# Patient Record
Sex: FEMALE | URBAN - METROPOLITAN AREA
[De-identification: names, ages, dates, MRNs, and addresses within clinical notes are randomized per-mention and may not be internally consistent; named-entity substitution may affect disease eponyms.]

---

## 2018-01-04 ENCOUNTER — APPOINTMENT (RX ONLY)
Dept: URBAN - METROPOLITAN AREA CLINIC 121 | Facility: CLINIC | Age: 73
Setting detail: DERMATOLOGY
End: 2018-01-04

## 2018-01-04 DIAGNOSIS — L90.5 SCAR CONDITIONS AND FIBROSIS OF SKIN: ICD-10-CM

## 2018-01-04 DIAGNOSIS — D18.0 HEMANGIOMA: ICD-10-CM

## 2018-01-04 DIAGNOSIS — L82.1 OTHER SEBORRHEIC KERATOSIS: ICD-10-CM

## 2018-01-04 DIAGNOSIS — D485 NEOPLASM OF UNCERTAIN BEHAVIOR OF SKIN: ICD-10-CM

## 2018-01-04 PROBLEM — M12.9 ARTHROPATHY, UNSPECIFIED: Status: ACTIVE | Noted: 2018-01-04

## 2018-01-04 PROBLEM — F32.9 MAJOR DEPRESSIVE DISORDER, SINGLE EPISODE, UNSPECIFIED: Status: ACTIVE | Noted: 2018-01-04

## 2018-01-04 PROBLEM — D18.01 HEMANGIOMA OF SKIN AND SUBCUTANEOUS TISSUE: Status: ACTIVE | Noted: 2018-01-04

## 2018-01-04 PROBLEM — J45.909 UNSPECIFIED ASTHMA, UNCOMPLICATED: Status: ACTIVE | Noted: 2018-01-04

## 2018-01-04 PROBLEM — I25.10 ATHEROSCLEROTIC HEART DISEASE OF NATIVE CORONARY ARTERY WITHOUT ANGINA PECTORIS: Status: ACTIVE | Noted: 2018-01-04

## 2018-01-04 PROBLEM — D48.5 NEOPLASM OF UNCERTAIN BEHAVIOR OF SKIN: Status: ACTIVE | Noted: 2018-01-04

## 2018-01-04 PROBLEM — L57.0 ACTINIC KERATOSIS: Status: ACTIVE | Noted: 2018-01-04

## 2018-01-04 PROCEDURE — ? BIOPSY BY SHAVE METHOD

## 2018-01-04 PROCEDURE — 11100: CPT

## 2018-01-04 PROCEDURE — 99203 OFFICE O/P NEW LOW 30 MIN: CPT | Mod: 25

## 2018-01-04 PROCEDURE — ? COUNSELING

## 2018-01-04 ASSESSMENT — LOCATION DETAILED DESCRIPTION DERM
LOCATION DETAILED: INFERIOR THORACIC SPINE
LOCATION DETAILED: INFERIOR MID FOREHEAD
LOCATION DETAILED: RIGHT INFERIOR CENTRAL MALAR CHEEK
LOCATION DETAILED: PERIUMBILICAL SKIN
LOCATION DETAILED: RIGHT PROXIMAL PRETIBIAL REGION
LOCATION DETAILED: LEFT CENTRAL MALAR CHEEK

## 2018-01-04 ASSESSMENT — LOCATION ZONE DERM
LOCATION ZONE: FACE
LOCATION ZONE: TRUNK
LOCATION ZONE: LEG

## 2018-01-04 ASSESSMENT — LOCATION SIMPLE DESCRIPTION DERM
LOCATION SIMPLE: LEFT CHEEK
LOCATION SIMPLE: UPPER BACK
LOCATION SIMPLE: RIGHT PRETIBIAL REGION
LOCATION SIMPLE: ABDOMEN
LOCATION SIMPLE: INFERIOR FOREHEAD
LOCATION SIMPLE: RIGHT CHEEK

## 2018-01-04 NOTE — PROCEDURE: BIOPSY BY SHAVE METHOD
Biopsy Method: Dermablade
Additional Anesthesia Volume In Cc (Will Not Render If 0): 0
Render Post-Care Instructions In Note?: no
Body Location Override (Optional - Billing Will Still Be Based On Selected Body Map Location If Applicable): Right Lower Leg
Type Of Destruction Used: Curettage
Notification Instructions: Patient will be notified of biopsy results. However, patient instructed to call the office if not contacted within 2 weeks.
Post-Care Instructions: I reviewed with the patient in detail post-care instructions. Patient is to keep the biopsy site dry overnight, and then apply bacitracin twice daily until healed. Patient may apply hydrogen peroxide soaks to remove any crusting.
Cryotherapy Text: The wound bed was treated with cryotherapy after the biopsy was performed.
Consent: Written consent was obtained and risks were reviewed including but not limited to scarring, infection, bleeding, scabbing, incomplete removal, nerve damage and allergy to anesthesia.
Wound Care: Bacitracin
Anesthesia Type: 1% lidocaine without epinephrine
Billing Type: [employer]
Detail Level: Detailed
Hemostasis: Drysol
Lab: [facility]
Silver Nitrate Text: The wound bed was treated with silver nitrate after the biopsy was performed.
Electrodesiccation Text: The wound bed was treated with electrodesiccation after the biopsy was performed.
Dressing: bandage
Biopsy Type: H and E
Electrodesiccation And Curettage Text: The wound bed was treated with electrodesiccation and curettage after the biopsy was performed.

## 2018-03-22 ENCOUNTER — IMPORTED ENCOUNTER (OUTPATIENT)
Dept: URBAN - METROPOLITAN AREA CLINIC 31 | Facility: CLINIC | Age: 73
End: 2018-03-22

## 2018-03-22 PROBLEM — D31.32: Noted: 2018-03-22

## 2018-03-22 PROBLEM — Z96.1: Noted: 2018-03-22

## 2018-03-22 PROBLEM — H01.005: Noted: 2018-03-22

## 2018-03-22 PROBLEM — H01.004: Noted: 2018-03-22

## 2018-03-22 PROBLEM — H01.002: Noted: 2018-03-22

## 2018-03-22 PROBLEM — H01.001: Noted: 2018-03-22

## 2018-03-22 PROBLEM — H26.493: Noted: 2018-03-22

## 2018-03-22 PROCEDURE — 99213 OFFICE O/P EST LOW 20 MIN: CPT

## 2018-03-22 PROCEDURE — 92250 FUNDUS PHOTOGRAPHY W/I&R: CPT

## 2019-10-22 ENCOUNTER — IMPORTED ENCOUNTER (OUTPATIENT)
Dept: URBAN - METROPOLITAN AREA CLINIC 31 | Facility: CLINIC | Age: 74
End: 2019-10-22

## 2019-10-22 PROBLEM — E11.9: Noted: 2019-10-22

## 2019-10-22 PROBLEM — H26.493: Noted: 2019-10-22

## 2019-10-22 PROBLEM — H43.391: Noted: 2019-10-22

## 2019-10-22 PROBLEM — H26.492: Noted: 2019-10-22

## 2019-10-22 PROBLEM — D31.32: Noted: 2019-10-22

## 2019-10-22 PROBLEM — Z96.1: Noted: 2019-10-22

## 2019-10-22 PROBLEM — H43.813: Noted: 2019-10-22

## 2019-10-22 PROCEDURE — 92250 FUNDUS PHOTOGRAPHY W/I&R: CPT

## 2019-10-22 PROCEDURE — 92014 COMPRE OPH EXAM EST PT 1/>: CPT

## 2019-11-29 ENCOUNTER — NEW PATIENT (OUTPATIENT)
Dept: URBAN - METROPOLITAN AREA CLINIC 26 | Facility: CLINIC | Age: 74
End: 2019-11-29

## 2019-11-29 VITALS
HEART RATE: 80 BPM | BODY MASS INDEX: 30.73 KG/M2 | SYSTOLIC BLOOD PRESSURE: 145 MMHG | WEIGHT: 180 LBS | HEIGHT: 64 IN | DIASTOLIC BLOOD PRESSURE: 76 MMHG

## 2019-11-29 DIAGNOSIS — H35.363: ICD-10-CM

## 2019-11-29 DIAGNOSIS — H43.392: ICD-10-CM

## 2019-11-29 DIAGNOSIS — H43.391: ICD-10-CM

## 2019-11-29 DIAGNOSIS — E11.9: ICD-10-CM

## 2019-11-29 DIAGNOSIS — H43.811: ICD-10-CM

## 2019-11-29 PROCEDURE — 99204 OFFICE O/P NEW MOD 45 MIN: CPT

## 2019-11-29 PROCEDURE — 92225 OPHTHALMOSCOPY (INITIAL): CPT

## 2019-11-29 PROCEDURE — 92250 FUNDUS PHOTOGRAPHY W/I&R: CPT

## 2019-11-29 ASSESSMENT — TONOMETRY
OS_IOP_MMHG: 13
OD_IOP_MMHG: 13

## 2019-11-29 ASSESSMENT — VISUAL ACUITY
OS_SC: 20/20-1
OD_SC: 20/30-2

## 2020-07-16 ENCOUNTER — IMPORTED ENCOUNTER (OUTPATIENT)
Dept: URBAN - METROPOLITAN AREA CLINIC 31 | Facility: CLINIC | Age: 75
End: 2020-07-16

## 2020-07-16 PROBLEM — Z96.1: Noted: 2020-07-16

## 2020-07-16 PROBLEM — E11.9: Noted: 2020-07-16

## 2020-07-16 PROBLEM — D31.32: Noted: 2020-07-16

## 2020-07-16 PROBLEM — H26.492: Noted: 2020-07-16

## 2020-07-16 PROCEDURE — 92250 FUNDUS PHOTOGRAPHY W/I&R: CPT

## 2020-07-16 PROCEDURE — 92014 COMPRE OPH EXAM EST PT 1/>: CPT

## 2022-04-02 ASSESSMENT — VISUAL ACUITY
OS_SC: J314''
OS_SC: J1014''
OS_CC: 20/25
OD_SC: J714''
OD_CC: 20/25
OD_CC: 20/25-2
OS_CC: 20/20
OD_SC: J1014''
OS_CC: 20/25
OD_CC: 20/30+2

## 2022-04-02 ASSESSMENT — TONOMETRY
OS_IOP_MMHG: 10
OD_IOP_MMHG: 12
OS_IOP_MMHG: 12
OS_IOP_MMHG: 12
OD_IOP_MMHG: 10
OD_IOP_MMHG: 12

## 2022-07-09 ENCOUNTER — TELEPHONE ENCOUNTER (OUTPATIENT)
Dept: URBAN - METROPOLITAN AREA CLINIC 121 | Facility: CLINIC | Age: 77
End: 2022-07-09

## 2022-07-09 RX ORDER — ALPRAZOLAM 0.25 MG
TABLET ORAL AS NEEDED
Refills: 0 | OUTPATIENT
Start: 2017-09-07 | End: 2017-10-31

## 2022-07-09 RX ORDER — MULTIVIT-MIN/FA/LYCOPEN/LUTEIN .4-300-25
TABLET ORAL ONCE A DAY
Refills: 0 | OUTPATIENT
Start: 2017-10-31 | End: 2019-07-15

## 2022-07-09 RX ORDER — EZETIMIBE AND SIMVASTATIN 10; 20 MG/1; MG/1
TABLET ORAL ONCE A DAY
Refills: 0 | OUTPATIENT
Start: 2019-05-21 | End: 2019-07-15

## 2022-07-09 RX ORDER — OMEPRAZOLE 40 MG/1
CAPSULE, DELAYED RELEASE ORAL ONCE A DAY
Refills: 0 | OUTPATIENT
Start: 2019-05-21 | End: 2019-07-15

## 2022-07-09 RX ORDER — ALBUTEROL SULFATE 90 UG/1
AEROSOL, METERED RESPIRATORY (INHALATION)
Refills: 0 | OUTPATIENT
Start: 2019-05-21 | End: 2019-07-15

## 2022-07-09 RX ORDER — ACETAMINOPHEN, DEXTROMETHORPHAN HBR, DOXYLAMINE SUCCINATE, PHENYLEPHRINE HCL 10-5-325MG
KIT ORAL ONCE A DAY
Refills: 0 | OUTPATIENT
Start: 2017-10-31 | End: 2019-07-15

## 2022-07-09 RX ORDER — MONTELUKAST 10 MG/1
TABLET, FILM COATED ORAL ONCE A DAY
Refills: 0 | OUTPATIENT
Start: 2017-10-31 | End: 2019-07-15

## 2022-07-09 RX ORDER — TRAMADOL HYDROCHLORIDE AND ACETAMINOPHEN 37.5; 325 MG/1; MG/1
TABLET, FILM COATED ORAL
Refills: 0 | OUTPATIENT
Start: 2017-09-07 | End: 2017-10-31

## 2022-07-09 RX ORDER — BISMUTH SUBSALICYLATE 525 MG/1
TABLET ORAL ONCE A DAY
Refills: 0 | OUTPATIENT
Start: 2017-10-31 | End: 2019-07-15

## 2022-07-09 RX ORDER — ASPIRIN 81 MG/1
TABLET, COATED ORAL ONCE A DAY
Refills: 0 | OUTPATIENT
Start: 2017-09-07 | End: 2017-10-31

## 2022-07-09 RX ORDER — NITROFURANTOIN 100 MG/1
CAPSULE ORAL TAKE AS DIRECTED
Refills: 0 | OUTPATIENT
Start: 2019-05-21 | End: 2019-07-15

## 2022-07-09 RX ORDER — CEFDINIR 300 MG/1
CAPSULE ORAL TWICE A DAY
Refills: 0 | OUTPATIENT
Start: 2019-05-21 | End: 2019-07-15

## 2022-07-09 RX ORDER — ALPRAZOLAM 0.25 MG
TABLET ORAL AS NEEDED
Refills: 0 | OUTPATIENT
Start: 2017-10-31 | End: 2019-07-15

## 2022-07-09 RX ORDER — ESCITALOPRAM OXALATE 5 MG/1
TABLET, FILM COATED ORAL ONCE A DAY
Refills: 0 | OUTPATIENT
Start: 2019-05-21 | End: 2019-07-15

## 2022-07-09 RX ORDER — TRAMADOL HYDROCHLORIDE AND ACETAMINOPHEN 37.5; 325 MG/1; MG/1
TABLET, FILM COATED ORAL
Refills: 0 | OUTPATIENT
Start: 2017-10-31 | End: 2019-07-15

## 2022-07-09 RX ORDER — MULTIVIT-MIN/FA/LYCOPEN/LUTEIN .4-300-25
TABLET ORAL ONCE A DAY
Refills: 0 | OUTPATIENT
Start: 2017-09-07 | End: 2017-10-31

## 2022-07-09 RX ORDER — NEBIVOLOL HYDROCHLORIDE 2.5 MG/1
TABLET ORAL ONCE A DAY
Refills: 0 | OUTPATIENT
Start: 2017-10-31 | End: 2019-07-15

## 2022-07-09 RX ORDER — ASPIRIN 81 MG/1
TABLET, COATED ORAL ONCE A DAY
Refills: 0 | OUTPATIENT
Start: 2017-10-31 | End: 2019-07-15

## 2022-07-09 RX ORDER — NEBIVOLOL HYDROCHLORIDE 2.5 MG/1
TABLET ORAL ONCE A DAY
Refills: 0 | OUTPATIENT
Start: 2017-09-07 | End: 2017-10-31

## 2022-07-09 RX ORDER — ACETAMINOPHEN, DEXTROMETHORPHAN HBR, DOXYLAMINE SUCCINATE, PHENYLEPHRINE HCL 10-5-325MG
KIT ORAL ONCE A DAY
Refills: 0 | OUTPATIENT
Start: 2017-09-07 | End: 2017-10-31

## 2022-07-09 RX ORDER — MONTELUKAST 10 MG/1
TABLET, FILM COATED ORAL ONCE A DAY
Refills: 0 | OUTPATIENT
Start: 2017-09-07 | End: 2017-10-31

## 2022-07-10 ENCOUNTER — TELEPHONE ENCOUNTER (OUTPATIENT)
Dept: URBAN - METROPOLITAN AREA CLINIC 121 | Facility: CLINIC | Age: 77
End: 2022-07-10

## 2022-07-10 RX ORDER — MONTELUKAST 10 MG/1
TABLET, FILM COATED ORAL ONCE A DAY
Refills: 0 | Status: ACTIVE | COMMUNITY
Start: 2019-07-15

## 2022-07-10 RX ORDER — ALPRAZOLAM 0.25 MG
TABLET ORAL AS NEEDED
Refills: 0 | Status: ACTIVE | COMMUNITY
Start: 2019-07-15

## 2022-07-10 RX ORDER — OMEPRAZOLE 40 MG/1
CAPSULE, DELAYED RELEASE ORAL ONCE A DAY
Refills: 0 | Status: ACTIVE | COMMUNITY
Start: 2019-07-15

## 2022-07-10 RX ORDER — ESCITALOPRAM OXALATE 5 MG/1
TABLET, FILM COATED ORAL ONCE A DAY
Refills: 0 | Status: ACTIVE | COMMUNITY
Start: 2019-07-15

## 2022-07-10 RX ORDER — TRAMADOL HYDROCHLORIDE AND ACETAMINOPHEN 37.5; 325 MG/1; MG/1
TABLET, FILM COATED ORAL
Refills: 0 | Status: ACTIVE | COMMUNITY
Start: 2019-07-15

## 2022-07-10 RX ORDER — NEBIVOLOL HYDROCHLORIDE 2.5 MG/1
TABLET ORAL ONCE A DAY
Refills: 0 | Status: ACTIVE | COMMUNITY
Start: 2019-07-15

## 2022-07-10 RX ORDER — DENOSUMAB 60 MG/ML
INJECTION SUBCUTANEOUS ONCE A DAY
Refills: 0 | Status: ACTIVE | COMMUNITY
Start: 2019-07-15

## 2022-07-10 RX ORDER — ACETAMINOPHEN, DEXTROMETHORPHAN HBR, DOXYLAMINE SUCCINATE, PHENYLEPHRINE HCL 10-5-325MG
KIT ORAL ONCE A DAY
Refills: 0 | Status: ACTIVE | COMMUNITY
Start: 2019-07-15

## 2022-07-10 RX ORDER — MULTIVIT-MIN/FA/LYCOPEN/LUTEIN .4-300-25
TABLET ORAL ONCE A DAY
Refills: 0 | Status: ACTIVE | COMMUNITY
Start: 2019-07-15

## 2022-07-10 RX ORDER — ALBUTEROL SULFATE 90 UG/1
AEROSOL, METERED RESPIRATORY (INHALATION)
Refills: 0 | Status: ACTIVE | COMMUNITY
Start: 2019-07-15

## 2022-07-10 RX ORDER — EZETIMIBE AND SIMVASTATIN 10; 20 MG/1; MG/1
TABLET ORAL ONCE A DAY
Refills: 0 | Status: ACTIVE | COMMUNITY
Start: 2019-07-15

## 2022-07-10 RX ORDER — OMEPRAZOLE 40 MG/1
QD CAPSULE, DELAYED RELEASE ORAL
Refills: 2 | Status: ACTIVE | COMMUNITY
Start: 2017-10-16

## 2022-07-10 RX ORDER — NITROFURANTOIN 100 MG/1
CAPSULE ORAL TAKE AS DIRECTED
Refills: 0 | Status: ACTIVE | COMMUNITY
Start: 2019-07-15

## 2022-07-10 RX ORDER — BISMUTH SUBSALICYLATE 525 MG/1
TABLET ORAL ONCE A DAY
Refills: 0 | Status: ACTIVE | COMMUNITY
Start: 2019-07-15